# Patient Record
Sex: MALE | Race: WHITE | NOT HISPANIC OR LATINO | Employment: OTHER | RURAL
[De-identification: names, ages, dates, MRNs, and addresses within clinical notes are randomized per-mention and may not be internally consistent; named-entity substitution may affect disease eponyms.]

---

## 2022-04-13 ENCOUNTER — HOSPITAL ENCOUNTER (EMERGENCY)
Facility: HOSPITAL | Age: 52
Discharge: HOME | End: 2022-04-13
Attending: EMERGENCY MEDICINE
Payer: MEDICARE

## 2022-04-13 VITALS
WEIGHT: 250 LBS | HEIGHT: 74 IN | SYSTOLIC BLOOD PRESSURE: 151 MMHG | OXYGEN SATURATION: 97 % | TEMPERATURE: 98 F | RESPIRATION RATE: 18 BRPM | DIASTOLIC BLOOD PRESSURE: 92 MMHG | HEART RATE: 99 BPM | BODY MASS INDEX: 32.08 KG/M2

## 2022-04-13 DIAGNOSIS — E83.42 HYPOMAGNESEMIA: ICD-10-CM

## 2022-04-13 DIAGNOSIS — E11.65 UNCONTROLLED TYPE 2 DIABETES MELLITUS WITH HYPERGLYCEMIA: ICD-10-CM

## 2022-04-13 DIAGNOSIS — R06.02 SOB (SHORTNESS OF BREATH): ICD-10-CM

## 2022-04-13 DIAGNOSIS — I10 UNCONTROLLED HYPERTENSION: ICD-10-CM

## 2022-04-13 DIAGNOSIS — R59.0 MEDIASTINAL ADENOPATHY: Primary | ICD-10-CM

## 2022-04-13 LAB
ACETONE SERPL QL SCN: NEGATIVE
ALBUMIN SERPL BCP-MCNC: 3.5 G/DL (ref 3.5–5)
ALBUMIN/GLOB SERPL: 0.9 {RATIO}
ALP SERPL-CCNC: 103 U/L (ref 45–115)
ALT SERPL W P-5'-P-CCNC: 74 U/L (ref 16–61)
AMPHET UR QL SCN: NEGATIVE
AMYLASE SERPL-CCNC: 24 U/L (ref 25–115)
ANION GAP SERPL CALCULATED.3IONS-SCNC: 12 MMOL/L (ref 7–16)
AST SERPL W P-5'-P-CCNC: 41 U/L (ref 15–37)
BACTERIA #/AREA URNS HPF: ABNORMAL /HPF
BARBITURATES UR QL SCN: NEGATIVE
BASOPHILS # BLD AUTO: 0.04 K/UL (ref 0–0.2)
BASOPHILS NFR BLD AUTO: 0.6 % (ref 0–1)
BENZODIAZ METAB UR QL SCN: NEGATIVE
BILIRUB SERPL-MCNC: 0.7 MG/DL (ref 0–1.2)
BILIRUB UR QL STRIP: NEGATIVE
BUN SERPL-MCNC: 14 MG/DL (ref 7–18)
BUN/CREAT SERPL: 13 (ref 6–20)
CALCIUM SERPL-MCNC: 8.2 MG/DL (ref 8.5–10.1)
CANNABINOIDS UR QL SCN: NEGATIVE
CHLORIDE SERPL-SCNC: 101 MMOL/L (ref 98–107)
CLARITY UR: CLEAR
CO2 SERPL-SCNC: 26 MMOL/L (ref 21–32)
COARSE GRAN CASTS #/AREA URNS LPF: ABNORMAL /LPF
COCAINE UR QL SCN: NEGATIVE
COLOR UR: ABNORMAL
CREAT SERPL-MCNC: 1.1 MG/DL (ref 0.7–1.3)
D DIMER PPP FEU-MCNC: 1.36 ΜG/ML (ref 0–0.47)
DIFFERENTIAL METHOD BLD: ABNORMAL
EOSINOPHIL # BLD AUTO: 0.12 K/UL (ref 0–0.5)
EOSINOPHIL NFR BLD AUTO: 1.7 % (ref 1–4)
ERYTHROCYTE [DISTWIDTH] IN BLOOD BY AUTOMATED COUNT: 14.1 % (ref 11.5–14.5)
FINE GRAN CASTS #/AREA URNS LPF: ABNORMAL /LPF
GLOBULIN SER-MCNC: 3.7 G/DL (ref 2–4)
GLUCOSE SERPL-MCNC: 178 MG/DL (ref 70–105)
GLUCOSE SERPL-MCNC: 265 MG/DL (ref 70–105)
GLUCOSE SERPL-MCNC: 436 MG/DL (ref 74–106)
GLUCOSE UR STRIP-MCNC: >=1000 MG/DL
HCO3 UR-SCNC: 28.5 MMOL/L (ref 21–28)
HCT VFR BLD AUTO: 44.7 % (ref 40–54)
HGB BLD-MCNC: 15.1 G/DL (ref 13.5–18)
IMM GRANULOCYTES # BLD AUTO: 0.02 K/UL (ref 0–0.04)
IMM GRANULOCYTES NFR BLD: 0.3 % (ref 0–0.4)
KETONES UR STRIP-SCNC: NEGATIVE MG/DL
LEUKOCYTE ESTERASE UR QL STRIP: NEGATIVE
LYMPHOCYTES # BLD AUTO: 1.52 K/UL (ref 1–4.8)
LYMPHOCYTES NFR BLD AUTO: 21.1 % (ref 27–41)
MAGNESIUM SERPL-MCNC: 1.6 MG/DL (ref 1.7–2.3)
MCH RBC QN AUTO: 27.7 PG (ref 27–31)
MCHC RBC AUTO-ENTMCNC: 33.8 G/DL (ref 32–36)
MCV RBC AUTO: 82 FL (ref 80–96)
MIXED CELL CASTS #/AREA UR COMP ASSIST: ABNORMAL /LPF
MONOCYTES # BLD AUTO: 0.46 K/UL (ref 0–0.8)
MONOCYTES NFR BLD AUTO: 6.4 % (ref 2–6)
MPC BLD CALC-MCNC: 10.9 FL (ref 9.4–12.4)
NEUTROPHILS # BLD AUTO: 5.03 K/UL (ref 1.8–7.7)
NEUTROPHILS NFR BLD AUTO: 69.9 % (ref 53–65)
NITRITE UR QL STRIP: NEGATIVE
NT-PROBNP SERPL-MCNC: 4255 PG/ML (ref 1–125)
OPIATES UR QL SCN: NEGATIVE
PCO2 BLDA: 45 MMHG (ref 35–48)
PCP UR QL SCN: NEGATIVE
PH SMN: 7.41 [PH] (ref 7.35–7.45)
PH UR STRIP: 5.5 PH UNITS
PLATELET # BLD AUTO: 243 K/UL (ref 150–400)
PO2 BLDA: 75 MMHG (ref 83–108)
POC BASE EXCESS: 3.3 MMOL/L (ref -2–3)
POC SATURATED O2: 95 %
POTASSIUM SERPL-SCNC: 3.8 MMOL/L (ref 3.5–5.1)
PROT SERPL-MCNC: 7.2 G/DL (ref 6.4–8.2)
PROT UR QL STRIP: 100
RBC # BLD AUTO: 5.45 M/UL (ref 4.6–6.2)
RBC # UR STRIP: NEGATIVE /UL
RBC #/AREA URNS HPF: ABNORMAL /HPF
SODIUM SERPL-SCNC: 135 MMOL/L (ref 136–145)
SP GR UR STRIP: >=1.03
SQUAMOUS #/AREA URNS LPF: ABNORMAL /LPF
TROPONIN I SERPL HS-MCNC: 58.6 PG/ML
UROBILINOGEN UR STRIP-ACNC: 0.2 MG/DL
WBC # BLD AUTO: 7.19 K/UL (ref 4.5–11)
WBC #/AREA URNS HPF: ABNORMAL /HPF

## 2022-04-13 PROCEDURE — 80307 DRUG TEST PRSMV CHEM ANLYZR: CPT | Performed by: EMERGENCY MEDICINE

## 2022-04-13 PROCEDURE — 63600175 PHARM REV CODE 636 W HCPCS: Performed by: EMERGENCY MEDICINE

## 2022-04-13 PROCEDURE — 99900035 HC TECH TIME PER 15 MIN (STAT)

## 2022-04-13 PROCEDURE — 82962 GLUCOSE BLOOD TEST: CPT

## 2022-04-13 PROCEDURE — 99284 EMERGENCY DEPT VISIT MOD MDM: CPT | Performed by: EMERGENCY MEDICINE

## 2022-04-13 PROCEDURE — 82009 KETONE BODYS QUAL: CPT | Performed by: EMERGENCY MEDICINE

## 2022-04-13 PROCEDURE — 83735 ASSAY OF MAGNESIUM: CPT | Performed by: EMERGENCY MEDICINE

## 2022-04-13 PROCEDURE — 96376 TX/PRO/DX INJ SAME DRUG ADON: CPT | Mod: 59

## 2022-04-13 PROCEDURE — 96374 THER/PROPH/DIAG INJ IV PUSH: CPT

## 2022-04-13 PROCEDURE — 93005 ELECTROCARDIOGRAM TRACING: CPT

## 2022-04-13 PROCEDURE — 82150 ASSAY OF AMYLASE: CPT | Performed by: EMERGENCY MEDICINE

## 2022-04-13 PROCEDURE — 27000221 HC OXYGEN, UP TO 24 HOURS

## 2022-04-13 PROCEDURE — 25000003 PHARM REV CODE 250: Performed by: EMERGENCY MEDICINE

## 2022-04-13 PROCEDURE — 94760 N-INVAS EAR/PLS OXIMETRY 1: CPT | Mod: XB

## 2022-04-13 PROCEDURE — 83880 ASSAY OF NATRIURETIC PEPTIDE: CPT | Performed by: EMERGENCY MEDICINE

## 2022-04-13 PROCEDURE — 85025 COMPLETE CBC W/AUTO DIFF WBC: CPT | Performed by: EMERGENCY MEDICINE

## 2022-04-13 PROCEDURE — 82803 BLOOD GASES ANY COMBINATION: CPT | Mod: 59

## 2022-04-13 PROCEDURE — 82803 BLOOD GASES ANY COMBINATION: CPT

## 2022-04-13 PROCEDURE — 99285 EMERGENCY DEPT VISIT HI MDM: CPT | Mod: 25

## 2022-04-13 PROCEDURE — 93010 EKG 12-LEAD: ICD-10-PCS | Mod: ,,, | Performed by: EMERGENCY MEDICINE

## 2022-04-13 PROCEDURE — 80053 COMPREHEN METABOLIC PANEL: CPT | Performed by: EMERGENCY MEDICINE

## 2022-04-13 PROCEDURE — 96361 HYDRATE IV INFUSION ADD-ON: CPT

## 2022-04-13 PROCEDURE — 94640 AIRWAY INHALATION TREATMENT: CPT

## 2022-04-13 PROCEDURE — 36600 WITHDRAWAL OF ARTERIAL BLOOD: CPT

## 2022-04-13 PROCEDURE — 84484 ASSAY OF TROPONIN QUANT: CPT | Performed by: EMERGENCY MEDICINE

## 2022-04-13 PROCEDURE — 25000242 PHARM REV CODE 250 ALT 637 W/ HCPCS: Performed by: EMERGENCY MEDICINE

## 2022-04-13 PROCEDURE — 36415 COLL VENOUS BLD VENIPUNCTURE: CPT | Performed by: EMERGENCY MEDICINE

## 2022-04-13 PROCEDURE — 25500020 PHARM REV CODE 255: Performed by: EMERGENCY MEDICINE

## 2022-04-13 PROCEDURE — 85378 FIBRIN DEGRADE SEMIQUANT: CPT | Performed by: EMERGENCY MEDICINE

## 2022-04-13 PROCEDURE — 93010 ELECTROCARDIOGRAM REPORT: CPT | Mod: ,,, | Performed by: EMERGENCY MEDICINE

## 2022-04-13 PROCEDURE — 81001 URINALYSIS AUTO W/SCOPE: CPT | Mod: 59 | Performed by: EMERGENCY MEDICINE

## 2022-04-13 RX ORDER — METFORMIN HYDROCHLORIDE 500 MG/1
500 TABLET ORAL
Qty: 30 TABLET | Refills: 0 | Status: SHIPPED | OUTPATIENT
Start: 2022-04-13 | End: 2023-03-17

## 2022-04-13 RX ORDER — IPRATROPIUM BROMIDE AND ALBUTEROL SULFATE 2.5; .5 MG/3ML; MG/3ML
3 SOLUTION RESPIRATORY (INHALATION)
Status: COMPLETED | OUTPATIENT
Start: 2022-04-13 | End: 2022-04-13

## 2022-04-13 RX ORDER — LANOLIN ALCOHOL/MO/W.PET/CERES
400 CREAM (GRAM) TOPICAL DAILY
Qty: 30 TABLET | Refills: 0 | Status: SHIPPED | OUTPATIENT
Start: 2022-04-13 | End: 2023-03-17

## 2022-04-13 RX ADMIN — IPRATROPIUM BROMIDE AND ALBUTEROL SULFATE 3 ML: .5; 2.5 SOLUTION RESPIRATORY (INHALATION) at 10:04

## 2022-04-13 RX ADMIN — SODIUM CHLORIDE 1000 ML: 9 INJECTION, SOLUTION INTRAVENOUS at 10:04

## 2022-04-13 RX ADMIN — HUMAN INSULIN 10 UNITS: 100 INJECTION, SOLUTION SUBCUTANEOUS at 12:04

## 2022-04-13 RX ADMIN — IOPAMIDOL 80 ML: 755 INJECTION, SOLUTION INTRAVENOUS at 12:04

## 2022-04-13 RX ADMIN — HUMAN INSULIN 5 UNITS: 100 INJECTION, SOLUTION SUBCUTANEOUS at 01:04

## 2022-04-13 NOTE — ED PROVIDER NOTES
Encounter Date: 2022       History     Chief Complaint   Patient presents with    Shortness of Breath    Night Sweats     Patient presents with chief complaint of shortness of breath.  Patient states it started suddenly on  around the time of his son's birthday.  Denies chest pain.  Dyspnea is at rest and worse with exertion.  States he gets short of breath walking across the room.  He has not had any leg swelling or calf tenderness.  Patient states all of this began back in 2021. Reports that it started with a rash on his arms, dorsal surface.  Then in 2022 he began feeling generally weak with exertional weakness as well.  Reports in February he started having a feeling of feeling hungry all the time along with night sweats and increased abdominal gas.  Denies weight loss, states he feels like he has been gaining weight since February.  Patient previously worked as a  until he injured his back in  and has been on disability since then and has had low back surgery.  He has no focal deficits in his extremities.        Review of patient's allergies indicates:   Allergen Reactions    Morphine      Past Medical History:   Diagnosis Date    Mixed hyperlipidemia      Past Surgical History:   Procedure Laterality Date    LUMBAR FUSION      SHOULDER SURGERY       History reviewed. No pertinent family history.  Social History     Tobacco Use    Smoking status: Former Smoker     Types: Cigarettes     Quit date: 2022     Years since quittin.1    Smokeless tobacco: Never Used   Substance Use Topics    Drug use: Never     Review of Systems   Constitutional: Positive for activity change ( reports a sharp decrease in his activity level as he is fatigued all the time.  States he mostly lies around in the bed.), appetite change ( reports increased appetite), diaphoresis (Reports night sweats) and fatigue. Negative for chills, fever and unexpected weight  change.   HENT: Negative.    Eyes: Negative.    Respiratory: Positive for cough ( has had a chronic nonproductive cough for 1-2 months.) and shortness of breath. Negative for apnea, choking, chest tightness, wheezing and stridor.    Cardiovascular: Negative.  Negative for chest pain, palpitations and leg swelling.        Negative except for exertional dyspnea.   Gastrointestinal: Negative for abdominal distention, abdominal pain, anal bleeding, blood in stool, constipation, diarrhea, nausea, rectal pain and vomiting.        Reports increased gas and feeling of hunger all the time.   Endocrine: Negative for cold intolerance, heat intolerance, polydipsia, polyphagia and polyuria.   Genitourinary: Negative.    Musculoskeletal: Negative.    Skin: Positive for rash ( has had a scaling pink rash of the dorsum of both arms for 3-4 months.). Negative for color change, pallor and wound.   Neurological: Negative.    Hematological: Negative.    Psychiatric/Behavioral: Negative.    All other systems reviewed and are negative.      Physical Exam     Initial Vitals [04/13/22 0906]   BP Pulse Resp Temp SpO2   (!) 142/93 101 (!) 24 97.8 °F (36.6 °C) 95 %      MAP       --         Physical Exam    Nursing note and vitals reviewed.  Constitutional: He appears well-developed and well-nourished. He is not diaphoretic. He is active and cooperative.  Non-toxic appearance. He has a sickly appearance. He does not appear ill. No distress.   HENT:   Head: Normocephalic.   Right Ear: External ear normal.   Left Ear: External ear normal.   Nose: Nose normal.   Mouth/Throat: Oropharynx is clear and moist. No oropharyngeal exudate.   Eyes: Conjunctivae and EOM are normal. Pupils are equal, round, and reactive to light. Right eye exhibits no discharge. Left eye exhibits no discharge. No scleral icterus.   Neck: Neck supple. No tracheal deviation present. No JVD present.   Normal range of motion.  Cardiovascular: Normal rate, regular rhythm,  normal heart sounds and intact distal pulses.   No murmur heard.  Pulmonary/Chest: Breath sounds normal. No stridor. No respiratory distress. He has no wheezes. He has no rhonchi. He has no rales.   Abdominal: Abdomen is soft. Bowel sounds are normal. He exhibits no distension and no mass. There is no abdominal tenderness. There is no rebound and no guarding.   Musculoskeletal:         General: No tenderness or edema. Normal range of motion.      Cervical back: Normal range of motion and neck supple.     Lymphadenopathy:     He has no cervical adenopathy.   Neurological: He is alert and oriented to person, place, and time. He has normal strength. No cranial nerve deficit or sensory deficit. GCS score is 15. GCS eye subscore is 4. GCS verbal subscore is 5. GCS motor subscore is 6.   Skin: Skin is warm and dry. Capillary refill takes 2 to 3 seconds. No rash noted. No erythema. No pallor.   Psychiatric: He has a normal mood and affect. His behavior is normal.         Medical Screening Exam   See Full Note    ED Course   Procedures  Labs Reviewed   AMYLASE - Abnormal; Notable for the following components:       Result Value    Amylase 24 (*)     All other components within normal limits   NT-PRO NATRIURETIC PEPTIDE - Abnormal; Notable for the following components:    ProBNP 4,255 (*)     All other components within normal limits   COMPREHENSIVE METABOLIC PANEL - Abnormal; Notable for the following components:    Sodium 135 (*)     Glucose 436 (*)     Calcium 8.2 (*)     ALT 74 (*)     AST 41 (*)     All other components within normal limits   D DIMER, QUANTITATIVE - Abnormal; Notable for the following components:    D-Dimer 1.36 (*)     All other components within normal limits   MAGNESIUM - Abnormal; Notable for the following components:    Magnesium 1.6 (*)     All other components within normal limits   URINALYSIS, REFLEX TO URINE CULTURE - Abnormal; Notable for the following components:    Protein,   (*)      Glucose, UA >=1000 (*)     Specific Gravity, UA >=1.030 (*)     All other components within normal limits   CBC WITH DIFFERENTIAL - Abnormal; Notable for the following components:    Neutrophils % 69.9 (*)     Lymphocytes % 21.1 (*)     Monocytes % 6.4 (*)     All other components within normal limits   URINALYSIS, MICROSCOPIC - Abnormal; Notable for the following components:    Squamous Epithelial Cells, UA Occasional (*)     Fine Granular Casts, UA 10-25 (*)     Coarse Granular Casts, UA 0-2 (*)     Cellular Casts 2-5 (*)     All other components within normal limits   POCT GLUCOSE MONITORING CONTINUOUS - Abnormal; Notable for the following components:    POC Glucose 265 (*)     All other components within normal limits   POCT GLUCOSE MONITORING CONTINUOUS - Abnormal; Notable for the following components:    POC Glucose 178 (*)     All other components within normal limits   DRUG SCREEN, URINE (BEAKER) - Normal    Narrative:     The results of screening tests should be considered presumptive. Confirmatory testing is available upon request.    Cutoff Points:  PCP:         25ng/mL  AMPH:        500ng/mL  SAVANNAH:        200ng/mL  HENRIQUE:        200ng/mL  THC:         50ng/mL  CARMELINA:         300ng/mL  OPI:         2000ng/mL   TROPONIN I - Normal   CBC W/ AUTO DIFFERENTIAL    Narrative:     The following orders were created for panel order CBC auto differential.  Procedure                               Abnormality         Status                     ---------                               -----------         ------                     CBC with Differential[[id number]]        Abnormal            Final result                 Please view results for these tests on the individual orders.   ACETONE   POCT GLUCOSE MONITORING CONTINUOUS   POCT GLUCOSE MONITORING CONTINUOUS        ECG Results          EKG 12-lead (In process)  Result time 04/13/22 10:42:12    In process by Interface, Lab In [facility] (04/13/22 10:42:12)                  Narrative:    Test Reason : R06.02,    Vent. Rate : 106 BPM     Atrial Rate : 106 BPM     P-R Int : 164 ms          QRS Dur : 088 ms      QT Int : 362 ms       P-R-T Axes : 079 -70 049 degrees     QTc Int : 480 ms    Sinus tachycardia  Possible Left atrial enlargement  Left axis deviation  Nonspecific T wave abnormality  Abnormal ECG  No previous ECGs available    Referred By: [email or web address]   SELF           Confirmed By:                             Imaging Results          CTA Chest Non-Coronary (PE Study) (Final result)  Result time 04/13/22 12:19:45    Final result by Shaw Burleson DO (04/13/22 12:19:45)                 Impression:      1. No evidence to suggest pulmonary embolism.    2.  Multiple enlarged lymph nodes within the mediastinum and upper abdomen.  Correlate with B symptoms.      Electronically signed by: Sahw Burleson  Date:    04/13/2022  Time:    12:19             Narrative:    EXAMINATION:  CTA CHEST NON CORONARY    CLINICAL HISTORY:  Pulmonary embolism (PE) suspected, positive D-dimer;    TECHNIQUE:  Multiplanar CT of the chest with PE protocol.  MIP projections obtained.  This exam is adequate for interpretation.    COMPARISON:  Recent chest radiograph    FINDINGS:  Pulmonary artery: Patent    Lower neck: Multiple nonenlarged lymph nodes    Chest/airways: Lungs are clear    Mediastinum: There are multiple prominent and enlarged lymph nodes within the mediastinum.  Pre-vascular lymph node measures 1.4 cm.  Subcarinal lymph node at 1.7 cm.    Chest wall: Normal    Bones: Degenerative change    Upper abdomen: Multiple enlarged lymph nodes partially visualized in the hepatic gastric ligament and within the retroperitoneum.                               X-Ray Chest PA And Lateral (Final result)  Result time 04/13/22 09:42:15    Final result by Serenity Madrid MD (04/13/22 09:42:15)                 Impression:      No acute cardiopulmonary abnormality is seen.      Electronically signed by: Serenity  Mercy  Date:    04/13/2022  Time:    09:42             Narrative:    EXAMINATION:  XR CHEST PA AND LATERAL    CLINICAL HISTORY:  Shortness of breath    TECHNIQUE:  PA and lateral views of the chest were performed.    COMPARISON:  None    FINDINGS:  The heart and mediastinal contours are normal. The pulmonary vasculature is normal. There is no consolidation, pneumothorax, or pleural effusion. Degenerative changes are noted within the thoracic spine.                                 Medications   sodium chloride 0.9% bolus 1,000 mL (0 mLs Intravenous Stopped 4/13/22 1052)   albuterol-ipratropium 2.5 mg-0.5 mg/3 mL nebulizer solution 3 mL (3 mLs Nebulization Given 4/13/22 1013)   iopamidoL (ISOVUE-370) injection 100 mL (80 mLs Intravenous Given 4/13/22 1211)   insulin regular injection 10 Units (10 Units Intravenous Given 4/13/22 1225)   insulin regular injection 5 Units (5 Units Intravenous Given 4/13/22 1311)     Medical Decision Making:   Clinical Tests:   Radiological Study: Reviewed  ED Management:  Patient was given IV fluids and insulin glucose is now down to 178 mg/dL and patient feels better.  Radiologist report for CT PE study indicates no evidence of pulmonary embolism.  There is some hilar and abdominal adenopathy noted which will need further evaluation as an outpatient.                 Clinical Impression:   Final diagnoses:  [R06.02] SOB (shortness of breath)  [E11.65] Uncontrolled type 2 diabetes mellitus with hyperglycemia  [I10] Uncontrolled hypertension  [E83.42] Hypomagnesemia  [R59.0] Mediastinal adenopathy (Primary)          ED Disposition Condition    Discharge Stable        ED Prescriptions     Medication Sig Dispense Start Date End Date Auth. Provider    magnesium oxide (MAG-OX) 400 mg (241.3 mg magnesium) tablet Take 1 tablet (400 mg total) by mouth once daily. 30 tablet 4/13/2022 5/13/2022 Lion Owens DO    metFORMIN (GLUCOPHAGE) 500 MG tablet Take 1 tablet (500 mg total) by mouth daily  with breakfast. 30 tablet 4/13/2022 4/13/2023 Lion Owens,         Follow-up Information     Follow up With Specialties Details Why Contact Info    Wiley Tejeda MD Family Medicine Go in 1 day For further evaluation, and referral to pulmonology and Cardiology. [address]  [address]  [facility] [address]  [phone]             Lion Owens,   04/13/22 1353       Lion Owens DO  04/13/22 5883

## 2022-04-13 NOTE — ED TRIAGE NOTES
PT REPORTS COUGH AND SOB. PT DENIES CP. PT REPORTS NIGHT SWEATS FOR PAST MONTH. PT DENIES ANY HOME MEDICATIONS OR MEDICATION HISTORY.

## 2022-04-13 NOTE — ED NOTES
Dr. Owens notified of elevated BP.   Keystone Flap Text: The defect edges were debeveled with a #15 scalpel blade.  Given the location of the defect, shape of the defect a keystone flap was deemed most appropriate.  Using a sterile surgical marker, an appropriate keystone flap was drawn incorporating the defect, outlining the appropriate donor tissue and placing the expected incisions within the relaxed skin tension lines where possible. The area thus outlined was incised deep to adipose tissue with a #15 scalpel blade.  The skin margins were undermined to an appropriate distance in all directions around the primary defect and laterally outward around the flap utilizing iris scissors.

## 2022-04-14 ENCOUNTER — TELEPHONE (OUTPATIENT)
Dept: EMERGENCY MEDICINE | Facility: HOSPITAL | Age: 52
End: 2022-04-14
Payer: MEDICARE

## 2023-03-17 ENCOUNTER — HOSPITAL ENCOUNTER (EMERGENCY)
Facility: HOSPITAL | Age: 53
Discharge: HOME | End: 2023-03-17
Attending: SPECIALIST
Payer: MEDICARE

## 2023-03-17 VITALS
TEMPERATURE: 98 F | OXYGEN SATURATION: 98 % | DIASTOLIC BLOOD PRESSURE: 76 MMHG | HEART RATE: 50 BPM | RESPIRATION RATE: 18 BRPM | HEIGHT: 74 IN | BODY MASS INDEX: 28.88 KG/M2 | WEIGHT: 225 LBS | SYSTOLIC BLOOD PRESSURE: 110 MMHG

## 2023-03-17 DIAGNOSIS — F41.9 ANXIETY: ICD-10-CM

## 2023-03-17 DIAGNOSIS — H60.92 OTITIS EXTERNA OF LEFT EAR, UNSPECIFIED CHRONICITY, UNSPECIFIED TYPE: ICD-10-CM

## 2023-03-17 DIAGNOSIS — B86 SCABIES: Primary | ICD-10-CM

## 2023-03-17 PROCEDURE — 99284 EMERGENCY DEPT VISIT MOD MDM: CPT

## 2023-03-17 PROCEDURE — 99283 PR EMERGENCY DEPT VISIT,LEVEL III: ICD-10-PCS | Mod: EDII,,, | Performed by: SPECIALIST

## 2023-03-17 PROCEDURE — 99283 EMERGENCY DEPT VISIT LOW MDM: CPT | Mod: EDII,,, | Performed by: SPECIALIST

## 2023-03-17 RX ORDER — CIPROFLOXACIN AND DEXAMETHASONE 3; 1 MG/ML; MG/ML
4 SUSPENSION/ DROPS AURICULAR (OTIC) 2 TIMES DAILY
Qty: 7.5 ML | Status: SHIPPED | OUTPATIENT
Start: 2023-03-17

## 2023-03-17 RX ORDER — IVERMECTIN 3 MG/1
3 TABLET ORAL ONCE
Qty: 1 TABLET | Refills: 0 | Status: SHIPPED | OUTPATIENT
Start: 2023-03-17 | End: 2023-03-17

## 2023-03-17 RX ORDER — CARVEDILOL 3.12 MG/1
1 TABLET ORAL 2 TIMES DAILY
COMMUNITY
Start: 2023-02-27

## 2023-03-17 RX ORDER — ATORVASTATIN CALCIUM 40 MG/1
40 TABLET, FILM COATED ORAL
COMMUNITY
Start: 2023-02-16

## 2023-03-17 RX ORDER — PEN NEEDLE, DIABETIC 32GX 5/32"
NEEDLE, DISPOSABLE MISCELLANEOUS
COMMUNITY
Start: 2023-01-30

## 2023-03-17 RX ORDER — HYDROXYZINE PAMOATE 25 MG/1
25 CAPSULE ORAL EVERY 6 HOURS PRN
Qty: 30 CAPSULE | Refills: 0 | Status: SHIPPED | OUTPATIENT
Start: 2023-03-17

## 2023-03-17 RX ORDER — INSULIN ASPART 100 [IU]/ML
6 INJECTION, SOLUTION INTRAVENOUS; SUBCUTANEOUS 3 TIMES DAILY
COMMUNITY
Start: 2023-02-27

## 2023-03-17 RX ORDER — PEN NEEDLE, DIABETIC 29 G X1/2"
NEEDLE, DISPOSABLE MISCELLANEOUS
COMMUNITY
Start: 2023-01-30

## 2023-03-17 RX ORDER — PERMETHRIN 50 MG/G
CREAM TOPICAL
Qty: 60 G | Refills: 1 | Status: SHIPPED | OUTPATIENT
Start: 2023-03-17

## 2023-03-17 RX ORDER — INSULIN GLARGINE 100 [IU]/ML
15 INJECTION, SOLUTION SUBCUTANEOUS
COMMUNITY
Start: 2023-02-27

## 2023-03-17 RX ORDER — TRIAMCINOLONE ACETONIDE 1 MG/G
CREAM TOPICAL
COMMUNITY
Start: 2022-11-30

## 2023-03-17 RX ORDER — SPIRONOLACTONE 25 MG/1
25 TABLET ORAL
COMMUNITY
Start: 2023-02-27

## 2023-03-17 RX ORDER — PANTOPRAZOLE SODIUM 40 MG/1
1 TABLET, DELAYED RELEASE ORAL EVERY MORNING
COMMUNITY
Start: 2023-02-27

## 2023-03-17 RX ORDER — FUROSEMIDE 20 MG/1
1 TABLET ORAL 2 TIMES DAILY
COMMUNITY
Start: 2023-02-27

## 2023-03-17 RX ORDER — FERROUS SULFATE 325(65) MG
325 TABLET ORAL
COMMUNITY

## 2023-03-17 RX ORDER — GLIPIZIDE 5 MG/1
2 TABLET ORAL DAILY
COMMUNITY
Start: 2022-12-29

## 2023-03-17 RX ORDER — LOSARTAN POTASSIUM 50 MG/1
1 TABLET ORAL DAILY
COMMUNITY
Start: 2023-01-30

## 2023-03-17 RX ORDER — ACETAMINOPHEN 500 MG
1 TABLET ORAL DAILY
COMMUNITY

## 2023-03-17 NOTE — ED TRIAGE NOTES
"Pt complaining of pain left ear/jaw. States that he has scabies x1 year and now "they have moved into my ear. Noted scabbed spots to generalized body areas, arms, legs, trunk.  "

## 2023-03-17 NOTE — ED PROVIDER NOTES
Encounter Date: 3/17/2023       History     Chief Complaint   Patient presents with    Scabies     Patient is a 53 yo wm with multiple health issues and on disability.  He was helping a neighbor and contracted scabies x almost a year.  He has been told that he has psoriasis by his pcp.  Patient has multiple scabie like tracts in his arms and various places in his body.  He feels certain that is his condition and he appears very angry at his pcp for not seeing him.  I explained that she is not open on Friday.  He has with him a device which reveals activity under the skin and has been noticing tracks.     Review of patient's allergies indicates:   Allergen Reactions    Morphine      Past Medical History:   Diagnosis Date    CHF (congestive heart failure)     GERD (gastroesophageal reflux disease)     Hypertension     Mixed hyperlipidemia      Past Surgical History:   Procedure Laterality Date    LUMBAR FUSION      SHOULDER SURGERY       History reviewed. No pertinent family history.  Social History     Tobacco Use    Smoking status: Every Day     Types: Cigarettes     Last attempt to quit: 2022     Years since quittin.1    Smokeless tobacco: Never   Substance Use Topics    Alcohol use: Never    Drug use: Never     Review of Systems   Skin:  Positive for rash.        Severe itching which is constant.     Physical Exam     Initial Vitals [23 1721]   BP Pulse Resp Temp SpO2   129/85 (!) 50 18 97.8 °F (36.6 °C) 98 %      MAP       --         Physical Exam    Nursing note and vitals reviewed.  Constitutional: He appears well-developed and well-nourished. He appears distressed.   HENT:   Head: Normocephalic and atraumatic.   Eyes: Pupils are equal, round, and reactive to light.   Neck: Neck supple.   Normal range of motion.  Cardiovascular:  Regular rhythm.           Pulmonary/Chest: Breath sounds normal.   Musculoskeletal:         General: Normal range of motion.      Cervical back: Normal range of motion and  neck supple.     Neurological: He is alert and oriented to person, place, and time. He has normal strength. GCS score is 15. GCS eye subscore is 4. GCS verbal subscore is 5. GCS motor subscore is 6.   Skin: Skin is warm.   Multiple areas on skin that do appear like scabies   Psychiatric: Thought content normal.   angry       Medical Screening Exam   See Full Note    ED Course   Procedures  Labs Reviewed - No data to display       Imaging Results    None          Medications - No data to display                    Clinical Impression:   Final diagnoses:  [B86] Scabies (Primary)  [H60.92] Otitis externa of left ear, unspecified chronicity, unspecified type  [F41.9] Anxiety        ED Disposition Condition    Discharge Stable          ED Prescriptions       Medication Sig Dispense Start Date End Date Auth. Provider    permethrin (ELIMITE) 5 % cream Apply to affected area once - follow directions on label 60 g 3/17/2023 -- Monica Pleitez MD    ivermectin (STROMECTOL) 3 mg Tab (Expires today) Take 1 tablet (3 mg total) by mouth once. for 1 dose 1 tablet 3/17/2023 3/17/2023 Monica Pleitez MD    ciprofloxacin-dexAMETHasone 0.3-0.1% (CIPRODEX) 0.3-0.1 % DrpS Place 4 drops into both ears 2 (two) times daily. 7.5 mL 3/17/2023 -- Monica Pleitez MD    hydrOXYzine pamoate (VISTARIL) 25 MG Cap Take 1 capsule (25 mg total) by mouth every 6 (six) hours as needed (itching). 30 capsule 3/17/2023 -- Monica Pleitez MD          Follow-up Information    None          Monica Pleitez MD  03/17/23 2017

## 2023-04-15 ENCOUNTER — HOSPITAL ENCOUNTER (EMERGENCY)
Facility: HOSPITAL | Age: 53
End: 2023-04-15
Attending: PEDIATRICS
Payer: MEDICARE

## 2023-04-15 VITALS
RESPIRATION RATE: 20 BRPM | HEIGHT: 74 IN | WEIGHT: 231.13 LBS | DIASTOLIC BLOOD PRESSURE: 96 MMHG | HEART RATE: 67 BPM | SYSTOLIC BLOOD PRESSURE: 170 MMHG | OXYGEN SATURATION: 98 % | BODY MASS INDEX: 29.66 KG/M2 | TEMPERATURE: 99 F

## 2023-04-15 DIAGNOSIS — R21 RASH AND NONSPECIFIC SKIN ERUPTION: Primary | ICD-10-CM

## 2023-04-15 LAB — GLUCOSE SERPL-MCNC: 412 MG/DL (ref 70–105)

## 2023-04-15 PROCEDURE — 99283 EMERGENCY DEPT VISIT LOW MDM: CPT | Mod: EDII,,, | Performed by: PEDIATRICS

## 2023-04-15 PROCEDURE — 99283 PR EMERGENCY DEPT VISIT,LEVEL III: ICD-10-PCS | Mod: EDII,,, | Performed by: PEDIATRICS

## 2023-04-15 PROCEDURE — 99282 EMERGENCY DEPT VISIT SF MDM: CPT

## 2023-04-15 PROCEDURE — 82962 GLUCOSE BLOOD TEST: CPT

## 2023-04-15 NOTE — ED PROVIDER NOTES
Encounter Date: 4/15/2023       History     Chief Complaint   Patient presents with    Scabies     C/o scabies infection since 2021     Patient comes to the emergency room with a long drawn out history.  Reports he was diagnosed with scabies over a year ago.  He was placed on the creams.  He reports they did not improve.  He followed up with his primary care who told him it was psoriasis he tried psoriasis cream with no improvement.  He has been treated for scabies multiple times once with ivermectin.  Currently he is put in permethrin cream that he mixed in lotion on his body multiple times a day.  He reports he continues to have a the scabies and that the medicines are not working.  He denies any fevers with this he does have itching and scratches at the lesions throughout.  He reports some of the lesions have been there for months and not healing.  Reports he has a history of congestive heart failure.  Reports that he also has diabetes.  He reports that he takes his other medicines as appropriate.  He has a digital microscope that he puts on his hands arms feet to try to show me what the holes from the scabies look like when they bite into him to go into the skin and they are burrowing tunnels.    Patient purchased a large thing of permethrin mixing with lotion and that is what he is applying to a skin    Review of patient's allergies indicates:   Allergen Reactions    Morphine      Past Medical History:   Diagnosis Date    CHF (congestive heart failure)     GERD (gastroesophageal reflux disease)     Hypertension     Mixed hyperlipidemia      Past Surgical History:   Procedure Laterality Date    LUMBAR FUSION      SHOULDER SURGERY       No family history on file.  Social History     Tobacco Use    Smoking status: Every Day     Types: Cigarettes     Last attempt to quit: 2022     Years since quittin.2    Smokeless tobacco: Never   Substance Use Topics    Alcohol use: Never    Drug use: Never      Review of Systems   Skin:  Positive for rash.   Psychiatric/Behavioral:  Positive for sleep disturbance.    All other systems reviewed and are negative.    Physical Exam     Initial Vitals [04/15/23 1441]   BP Pulse Resp Temp SpO2   (!) 179/108 (!) 130 20 98.6 °F (37 °C) 98 %      MAP       --         Physical Exam    Nursing note and vitals reviewed.  Constitutional: He appears well-developed and well-nourished.  Non-toxic appearance. He does not appear ill. No distress.   Patient smells of petroleum   HENT:   Right Ear: External ear normal.   Left Ear: External ear normal.   Pulmonary/Chest: No respiratory distress.     Neurological: He is alert and oriented to person, place, and time.   Skin: Skin is warm and dry. Capillary refill takes less than 2 seconds.   Multiple pinpoint to 6 8 mm lesions on the arms legs.  Excoriations on the arms and legs.  Some all in different stages of healing.  Unclear if this is scabies based on presentation.  Some look follicular or like folliculitis.   Psychiatric: He has a normal mood and affect. His behavior is normal. Judgment and thought content normal.       Medical Screening Exam   See Full Note    ED Course   Procedures  Labs Reviewed   POCT GLUCOSE MONITORING CONTINUOUS - Abnormal; Notable for the following components:       Result Value    POC Glucose 412 (*)     All other components within normal limits          Imaging Results    None          Medications - No data to display  Medical Decision Making:   Initial Assessment:   Skin lesions not exactly consistent with scabies.  Somewhere papular somewhat crusted.  Differential Diagnosis:   Dermatitis, skin eruption secondary to chemicals placed on the skin,  ED Management:  Discuss that it appears the medicines have not been working.  He needs to stop using the permethrin over-the-counter that is placed in lotion.  I did not prescribe another doses of ivermectin as this did not help.  He will be referred to Dermatology  for evaluation of all these lesions  I discussed the once again he needs to stop the over-the-counter medicines and mixing the permethrin with the lotion and placing on his body.                 Clinical Impression:   Final diagnoses:  [R21] Rash and nonspecific skin eruption (Primary)        ED Disposition Condition    Discharge Stable          ED Prescriptions    None       Follow-up Information    None          Carlos Mac MD  04/15/23 1515       Carlos Mac MD  04/15/23 1510

## 2023-04-15 NOTE — DISCHARGE INSTRUCTIONS
Stop placing the permethrin mixed with lotion on your skin.  Do not purchase other medications over-the-counter off the Internet to placed on your skin to treat this scabies  You have been referred to dermatology.  They should be contacting you

## 2023-04-15 NOTE — ED TRIAGE NOTES
"C/o "scabies on skin since oct 2021 and nobody will treat me"-he has bottle of premethrin 10% insecticide concentrate "jose been putting this with this (generic fast obsorbing lotion)to dilute it down to 5% like the cream that is prescription because it is too expensive to get-explained to patient that it is not advised to use this on skin-he points to arms and legs -"dont you see what  they have done me"noted scar tissue appearing areas-strong diesel like odor noted  "

## 2024-03-27 ENCOUNTER — HOSPITAL ENCOUNTER (OUTPATIENT)
Dept: RADIOLOGY | Facility: HOSPITAL | Age: 54
Discharge: HOME | End: 2024-03-27
Attending: NURSE PRACTITIONER
Payer: MEDICARE

## 2024-03-27 DIAGNOSIS — R10.9 ABDOMINAL PAIN, UNSPECIFIED ABDOMINAL LOCATION: ICD-10-CM

## 2024-03-27 DIAGNOSIS — R06.00 DYSPNEA, UNSPECIFIED TYPE: ICD-10-CM

## 2024-03-27 PROCEDURE — 71046 X-RAY EXAM CHEST 2 VIEWS: CPT | Mod: TC

## 2024-03-27 PROCEDURE — 74019 RADEX ABDOMEN 2 VIEWS: CPT | Mod: TC

## 2024-03-27 PROCEDURE — 76700 US EXAM ABDOM COMPLETE: CPT | Mod: TC

## 2024-07-16 ENCOUNTER — OFFICE VISIT (OUTPATIENT)
Dept: DERMATOLOGY | Facility: CLINIC | Age: 54
End: 2024-07-16
Payer: MEDICARE

## 2024-07-16 DIAGNOSIS — R21 RASH AND NONSPECIFIC SKIN ERUPTION: Primary | ICD-10-CM

## 2024-07-16 DIAGNOSIS — L01.00 IMPETIGO: ICD-10-CM

## 2024-07-16 DIAGNOSIS — F22 DELUSIONS OF PARASITOSIS: ICD-10-CM

## 2024-07-16 PROCEDURE — 87186 SC STD MICRODIL/AGAR DIL: CPT | Mod: ,,, | Performed by: CLINICAL MEDICAL LABORATORY

## 2024-07-16 PROCEDURE — 1159F MED LIST DOCD IN RCRD: CPT | Mod: CPTII,,, | Performed by: STUDENT IN AN ORGANIZED HEALTH CARE EDUCATION/TRAINING PROGRAM

## 2024-07-16 PROCEDURE — 87070 CULTURE OTHR SPECIMN AEROBIC: CPT | Mod: ,,, | Performed by: CLINICAL MEDICAL LABORATORY

## 2024-07-16 PROCEDURE — 99205 OFFICE O/P NEW HI 60 MIN: CPT | Mod: ,,, | Performed by: STUDENT IN AN ORGANIZED HEALTH CARE EDUCATION/TRAINING PROGRAM

## 2024-07-16 PROCEDURE — 4010F ACE/ARB THERAPY RXD/TAKEN: CPT | Mod: CPTII,,, | Performed by: STUDENT IN AN ORGANIZED HEALTH CARE EDUCATION/TRAINING PROGRAM

## 2024-07-16 PROCEDURE — 87077 CULTURE AEROBIC IDENTIFY: CPT | Mod: ,,, | Performed by: CLINICAL MEDICAL LABORATORY

## 2024-07-16 NOTE — PROGRESS NOTES
[facility]  Christopher Enriquez MD    [address] [place] [address]  [phone]    Fax: [phone]    Patient Name: Matteo Zhao  Medical Record Number: [id number]  PCP: Lisette, Primary Doctor  Age: 53 y.o. : 1970  Contact: [phone] (home)     CC: rash  History of Present Illness:     Matteo Zhao is a 53 y.o.  male with no history of skin cancer who presents to clinic today for concern for bug infestation in his skin. Previous treatments includes TAC 0.1% cream, permethrin cream and ivermectin. Patient reports crawling sensations in the skin. He reports parasites come from his saliva, feces, and skin. He reveals photos of all of these today with no identifiable parasite.     The patient has no other concerns today.    Review of Systems:     Unremarkable other than mentioned above.     Physical Exam:     General: Relaxed, oriented, alert    Skin examination of the scalp, face, neck, chest, back, abdomen, upper extremities and lower extremities were normal except for as listed below    - scabies scrapping negative   Assessment and Plan:     1.  Impetigo   - honey colored crusted plaques on the face, bilateral arms, legs    - discussed infectious nature   - Culture obtained     2. Delusions of Parasitosis  - Pruritus with belief that it is caused by unobservable parasitosis  - also known as a monosymptomatic hypochondriacal psychosis    Plan:   - scabies scraping x 2 performed   - wound culture   - CBC to evaluate for peripheral eosinophilia   - drug screen ordered     Counseling  Skin care: I explained to the patient that there was no clinical evidence at this time of an infestation. I discussed that biopsy and other tests may be necessary to rule out infestations and other causes of their symptoms.  Expectations: Delusions of parasitosis manifests as a fixed belief that skin symptoms are caused by insects, parasites or other infestations. It is a  diagnosis of exclusion and infestations, systemic causes and metabolic causes of pruritus must be evaluated. We must be able to reasonably exclude a sweta skin condition (e.g., scabies incognito) or other possible organic conditions, such as substance abuse (e.g., cocaine), neurologic (e.g., multiple sclerosis), endocrine (e.g., diabetes mellitus), hematologic/oncologic (e.g., lymphoma), nutritional (e.g., B12 or folate deficiency), infectious (e.g., Human Immunodeficiency Virus), cardiovascular (e.g., congestive heart failure), or renal (e.g., uremic pruritus) disorders.    Treatment options include therapy, pimozide, olanzapine, risperidone.     30 minutes spent with patient, 10 minutes on scabies scraping x2, 20 minutes reviewing chart and documenting       Christopher Enriquez MD   Mohs Surgery/Dermatologic Oncology  Dermatology

## 2024-07-18 ENCOUNTER — TELEPHONE (OUTPATIENT)
Dept: DERMATOLOGY | Facility: CLINIC | Age: 54
End: 2024-07-18
Payer: MEDICARE

## 2024-07-18 LAB — MICROORGANISM SPEC CULT: ABNORMAL

## 2024-07-18 RX ORDER — DOXYCYCLINE 100 MG/1
100 CAPSULE ORAL 2 TIMES DAILY
Qty: 14 CAPSULE | Refills: 0 | Status: SHIPPED | OUTPATIENT
Start: 2024-07-18

## 2024-10-17 ENCOUNTER — HOSPITAL ENCOUNTER (EMERGENCY)
Facility: HOSPITAL | Age: 54
Discharge: HOME | End: 2024-10-17
Attending: INTERNAL MEDICINE
Payer: MEDICARE

## 2024-10-17 VITALS
HEART RATE: 81 BPM | BODY MASS INDEX: 29.29 KG/M2 | RESPIRATION RATE: 20 BRPM | HEIGHT: 74 IN | WEIGHT: 228.19 LBS | OXYGEN SATURATION: 99 % | DIASTOLIC BLOOD PRESSURE: 70 MMHG | SYSTOLIC BLOOD PRESSURE: 101 MMHG | TEMPERATURE: 98 F

## 2024-10-17 DIAGNOSIS — E11.65 TYPE 2 DIABETES MELLITUS WITH HYPERGLYCEMIA, WITHOUT LONG-TERM CURRENT USE OF INSULIN: Primary | ICD-10-CM

## 2024-10-17 DIAGNOSIS — I10 HYPERTENSION: ICD-10-CM

## 2024-10-17 LAB
ALBUMIN SERPL BCP-MCNC: 3.6 G/DL (ref 3.5–5)
ALBUMIN/GLOB SERPL: 0.8 {RATIO}
ALP SERPL-CCNC: 159 U/L (ref 45–115)
ALT SERPL W P-5'-P-CCNC: 42 U/L (ref 16–61)
AMPHET UR QL SCN: NEGATIVE
ANION GAP SERPL CALCULATED.3IONS-SCNC: 14 MMOL/L (ref 7–16)
AST SERPL W P-5'-P-CCNC: 21 U/L (ref 15–37)
BARBITURATES UR QL SCN: NEGATIVE
BASOPHILS # BLD AUTO: 0.09 K/UL (ref 0–0.2)
BASOPHILS NFR BLD AUTO: 1 % (ref 0–1)
BENZODIAZ METAB UR QL SCN: NEGATIVE
BILIRUB SERPL-MCNC: 0.8 MG/DL (ref ?–1.2)
BILIRUB UR QL STRIP: NEGATIVE
BUN SERPL-MCNC: 34 MG/DL (ref 7–18)
BUN/CREAT SERPL: 32 (ref 6–20)
CALCIUM SERPL-MCNC: 9.5 MG/DL (ref 8.5–10.1)
CANNABINOIDS UR QL SCN: NEGATIVE
CHLORIDE SERPL-SCNC: 95 MMOL/L (ref 98–107)
CLARITY UR: CLEAR
CO2 SERPL-SCNC: 26 MMOL/L (ref 21–32)
COCAINE UR QL SCN: NEGATIVE
COLOR UR: YELLOW
CREAT SERPL-MCNC: 1.07 MG/DL (ref 0.7–1.3)
DIFFERENTIAL METHOD BLD: ABNORMAL
EGFR (NO RACE VARIABLE) (RUSH/TITUS): 84 ML/MIN/1.73M2
EOSINOPHIL # BLD AUTO: 0.33 K/UL (ref 0–0.5)
EOSINOPHIL NFR BLD AUTO: 3.6 % (ref 1–4)
ERYTHROCYTE [DISTWIDTH] IN BLOOD BY AUTOMATED COUNT: 12.7 % (ref 11.5–14.5)
GLOBULIN SER-MCNC: 4.6 G/DL (ref 2–4)
GLUCOSE SERPL-MCNC: 379 MG/DL (ref 70–105)
GLUCOSE SERPL-MCNC: 408 MG/DL (ref 74–106)
GLUCOSE UR STRIP-MCNC: >=1000 MG/DL
HCT VFR BLD AUTO: 44.7 % (ref 40–54)
HGB BLD-MCNC: 16.3 G/DL (ref 13.5–18)
IMM GRANULOCYTES # BLD AUTO: 0.03 K/UL (ref 0–0.04)
IMM GRANULOCYTES NFR BLD: 0.3 % (ref 0–0.4)
KETONES UR STRIP-SCNC: NEGATIVE MG/DL
LEUKOCYTE ESTERASE UR QL STRIP: NEGATIVE
LYMPHOCYTES # BLD AUTO: 1.84 K/UL (ref 1–4.8)
LYMPHOCYTES NFR BLD AUTO: 20.3 % (ref 27–41)
MCH RBC QN AUTO: 29.1 PG (ref 27–31)
MCHC RBC AUTO-ENTMCNC: 36.5 G/DL (ref 32–36)
MCV RBC AUTO: 79.8 FL (ref 80–96)
MONOCYTES # BLD AUTO: 0.64 K/UL (ref 0–0.8)
MONOCYTES NFR BLD AUTO: 7.1 % (ref 2–6)
MPC BLD CALC-MCNC: 10.3 FL (ref 9.4–12.4)
NEUTROPHILS # BLD AUTO: 6.12 K/UL (ref 1.8–7.7)
NEUTROPHILS NFR BLD AUTO: 67.7 % (ref 53–65)
NITRITE UR QL STRIP: NEGATIVE
NRBC # BLD AUTO: 0 X10E3/UL
NRBC, AUTO (.00): 0 %
OPIATES UR QL SCN: NEGATIVE
PCP UR QL SCN: NEGATIVE
PH UR STRIP: 5.5 PH UNITS
PLATELET # BLD AUTO: 272 K/UL (ref 150–400)
POTASSIUM SERPL-SCNC: 4.1 MMOL/L (ref 3.5–5.1)
PROT SERPL-MCNC: 8.2 G/DL (ref 6.4–8.2)
PROT UR QL STRIP: NEGATIVE
RBC # BLD AUTO: 5.6 M/UL (ref 4.6–6.2)
RBC # UR STRIP: NEGATIVE /UL
SODIUM SERPL-SCNC: 131 MMOL/L (ref 136–145)
SP GR UR STRIP: 1.01
TROPONIN I SERPL DL<=0.01 NG/ML-MCNC: 14.9 PG/ML
UROBILINOGEN UR STRIP-ACNC: 0.2 MG/DL
WBC # BLD AUTO: 9.05 K/UL (ref 4.5–11)

## 2024-10-17 PROCEDURE — 81003 URINALYSIS AUTO W/O SCOPE: CPT | Performed by: INTERNAL MEDICINE

## 2024-10-17 PROCEDURE — 80307 DRUG TEST PRSMV CHEM ANLYZR: CPT | Performed by: INTERNAL MEDICINE

## 2024-10-17 PROCEDURE — 85025 COMPLETE CBC W/AUTO DIFF WBC: CPT | Performed by: INTERNAL MEDICINE

## 2024-10-17 PROCEDURE — 99284 EMERGENCY DEPT VISIT MOD MDM: CPT | Performed by: INTERNAL MEDICINE

## 2024-10-17 PROCEDURE — 80053 COMPREHEN METABOLIC PANEL: CPT | Performed by: INTERNAL MEDICINE

## 2024-10-17 PROCEDURE — 93005 ELECTROCARDIOGRAM TRACING: CPT

## 2024-10-17 PROCEDURE — 82962 GLUCOSE BLOOD TEST: CPT

## 2024-10-17 PROCEDURE — 99284 EMERGENCY DEPT VISIT MOD MDM: CPT | Mod: 25

## 2024-10-17 PROCEDURE — 93010 ELECTROCARDIOGRAM REPORT: CPT | Mod: ,,, | Performed by: INTERNAL MEDICINE

## 2024-10-17 PROCEDURE — 84484 ASSAY OF TROPONIN QUANT: CPT | Performed by: INTERNAL MEDICINE

## 2024-10-17 RX ORDER — DAPAGLIFLOZIN 10 MG/1
10 TABLET, FILM COATED ORAL
COMMUNITY
Start: 2024-10-17

## 2024-10-17 RX ORDER — POTASSIUM CHLORIDE 20 MEQ/1
20 TABLET, EXTENDED RELEASE ORAL
COMMUNITY
Start: 2024-09-20

## 2024-10-17 RX ORDER — INSULIN GLARGINE 300 U/ML
35 INJECTION, SOLUTION SUBCUTANEOUS
COMMUNITY
Start: 2024-09-30

## 2024-10-17 RX ORDER — NAPROXEN SODIUM 220 MG/1
81 TABLET, FILM COATED ORAL DAILY
COMMUNITY

## 2024-10-17 NOTE — ED PROVIDER NOTES
Encounter Date: 10/17/2024       History     Chief Complaint   Patient presents with    Hyperglycemia     C/o high blood sugar     Patient comes in because he had elevated blood sugar this morning.  He said initially was over 500.  He went to see his primary care provider, nurse practitioner eren, and it was over 400.  The patient denies any polyuria polydipsia polyphagia, chest pain, shortness breath, PND and orthopnea.  Has history congestive heart failure, hypertension noncompliance.    Patient was states he was admitted to [facility] approximately 4 months ago with congestive heart failure, he did not follow up with his cardiologist for tests after that hospitalization.        Review of patient's allergies indicates:   Allergen Reactions    Morphine      Past Medical History:   Diagnosis Date    CHF (congestive heart failure)     GERD (gastroesophageal reflux disease)     Hypertension     Mixed hyperlipidemia      Past Surgical History:   Procedure Laterality Date    LUMBAR FUSION      SHOULDER SURGERY       No family history on file.  Social History     Tobacco Use    Smoking status: Every Day     Current packs/day: 0.00     Types: Cigarettes     Last attempt to quit: 2022     Years since quittin.7    Smokeless tobacco: Never   Substance Use Topics    Alcohol use: Never    Drug use: Never     Review of Systems   Constitutional:  Negative for fever.   HENT:  Negative for sore throat.    Respiratory:  Negative for shortness of breath.    Cardiovascular:  Negative for chest pain.   Gastrointestinal:  Negative for nausea.   Genitourinary:  Negative for dysuria.   Musculoskeletal:  Negative for back pain.   Skin:  Negative for rash.   Neurological:  Negative for weakness.   Hematological:  Does not bruise/bleed easily.       Physical Exam     Initial Vitals   BP Pulse Resp Temp SpO2   10/17/24 1124 10/17/24 1111 10/17/24 1111 -- 10/17/24 1111   101/70 81 20  97 %      MAP       --                 Physical Exam    Nursing note and vitals reviewed.  Constitutional: He appears well-developed.   HENT:   Head: Normocephalic.   Eyes: Pupils are equal, round, and reactive to light.   Neck: Trachea normal. Neck supple.   Normal range of motion.   Full passive range of motion without pain.     Cardiovascular:  Normal rate, regular rhythm, normal heart sounds and normal pulses.           Pulmonary/Chest: Effort normal and breath sounds normal. No respiratory distress.   Abdominal: Abdomen is soft and protuberant. Bowel sounds are normal. There is no abdominal tenderness.   Musculoskeletal:         General: Normal range of motion.      Cervical back: Full passive range of motion without pain, normal range of motion and neck supple.     Neurological: He is alert. He has normal strength and normal reflexes. No cranial nerve deficit. GCS eye subscore is 4. GCS verbal subscore is 5. GCS motor subscore is 6.   Skin: Skin is warm.         Medical Screening Exam   See Full Note    ED Course   Procedures  Labs Reviewed   COMPREHENSIVE METABOLIC PANEL - Abnormal       Result Value    Sodium 131 (*)     Potassium 4.1      Chloride 95 (*)     CO2 26      Anion Gap 14      Glucose 408 (*)     BUN 34 (*)     Creatinine 1.07      BUN/Creatinine Ratio 32 (*)     Calcium 9.5      Total Protein 8.2      Albumin 3.6      Globulin 4.6 (*)     A/G Ratio 0.8      Bilirubin, Total 0.8      Alk Phos 159 (*)     ALT 42      AST 21      eGFR 84     URINALYSIS, REFLEX TO URINE CULTURE - Abnormal    Color, UA Yellow      Clarity, UA Clear      pH, UA 5.5      Leukocytes, UA Negative      Nitrites, UA Negative      Protein, UA Negative      Glucose, UA >=1000 (*)     Ketones, UA Negative      Urobilinogen, UA 0.2      Bilirubin, UA Negative      Blood, UA Negative      Specific Gravity, UA 1.010     CBC WITH DIFFERENTIAL - Abnormal    WBC 9.05      RBC 5.60      Hemoglobin 16.3      Hematocrit 44.7      MCV 79.8 (*)     MCH 29.1      MCHC  36.5 (*)     RDW 12.7      Platelet Count 272      MPV 10.3      Neutrophils % 67.7 (*)     Lymphocytes % 20.3 (*)     Monocytes % 7.1 (*)     Eosinophils % 3.6      Basophils % 1.0      Immature Granulocytes % 0.3      nRBC, Auto 0.0      Neutrophils, Abs 6.12      Lymphocytes, Absolute 1.84      Monocytes, Absolute 0.64      Eosinophils, Absolute 0.33      Basophils, Absolute 0.09      Immature Granulocytes, Absolute 0.03      nRBC, Absolute 0.00      Diff Type Auto     POCT GLUCOSE MONITORING CONTINUOUS - Abnormal    POC Glucose 379 (*)    TROPONIN I - Normal    Troponin I High Sensitivity 14.9     DRUG SCREEN, URINE (BEAKER) - Normal    Barbiturates, Urine Negative      Benzodiazepine, Urine Negative      Opiates, Urine Negative      Phencyclidine, Urine Negative      Amphetamine, Urine Negative      Cannabinoid, Urine Negative      Cocaine, Urine Negative      Narrative:     The results of screening tests should be considered presumptive. Confirmatory testing is available upon request.    Cutoff Points:  PCP:         25ng/mL  AMPH:        500ng/mL  SAVANNAH:        200ng/mL  HENRIQUE:        200ng/mL  THC:         50ng/mL  CARMELINA:         300ng/mL  OPI:         2000ng/mL   CBC W/ AUTO DIFFERENTIAL    Narrative:     The following orders were created for panel order CBC auto differential.  Procedure                               Abnormality         Status                     ---------                               -----------         ------                     CBC with Differential[[id number]]       Abnormal            Final result                 Please view results for these tests on the individual orders.   POCT GLUCOSE MONITORING CONTINUOUS     EKG Readings: (Independently Interpreted)   Initial Reading: No STEMI. Previous EKG: Compared with most recent EKG Previous EKG Date: 04/13/2022. Rhythm: Sinus Tachycardia. Heart Rate: 102. Ectopy: No Ectopy. Conduction: Normal. Axis: Left Axis Deviation. Clinical Impression:  Sinus Tachycardia   SINUS TACHYCARDIA RATE  NO ACUTE ISCHEMIC CHANGES NO SIGNIFICANT CHANGE FROM PREVIOUS EKG       Imaging Results    None          Medications   insulin regular injection 5 Units 0.05 mL (has no administration in time range)     Medical Decision Making  Patient is a type 2 insulin-dependent diabetes mellitus patient history of noncompliance with a elevated blood sugar come rule out ketoacidosis, urinary tract infection, or electrolyte imbalance.    Amount and/or Complexity of Data Reviewed  Labs: ordered. Decision-making details documented in ED Course.  Discussion of management or test interpretation with external provider(s): Patient was blood sugar is 408, will give 5 units of IV insulin, advised patient to monitor his blood sugars for the next 4 hours.  He was follow up with the primary care provider, advised to get a hemoglobin A1c level, in May axis primary care provider to refer him to Endocrinology.  Also advised him to follow back up with the [facility]ologist at [facility], post hospitalization follow up.  Cardiac workup was negative.  See no indication for further workup or admission at this time.    Risk  OTC drugs.               ED Course as of 10/17/24 1218   Thu Oct 17, 2024   1118 POC Glucose(!): 379 [PW]   1135 Urinalysis, Reflex to Urine Culture(!) [PW]   1145 Drug Screen, Urine [PW]   1151 CBC auto differential(!) [PW]   1207 Comprehensive metabolic panel(!) [PW]   1208 Troponin I High Sensitivity: 14.9 [PW]   1217 Patient was demanded in his left before given insulin, states he had to leave. [PW]      ED Course User Index  [PW] Hank Rivera MD                           Clinical Impression:   Final diagnoses:  [I10] Hypertension  [E11.65] Type 2 diabetes mellitus with hyperglycemia, without long-term current use of insulin (Primary)        ED Disposition Condition    Discharge Stable          ED Prescriptions    None       Follow-up Information       Follow up With  Specialties Details Why Contact Info    [facility], Emergency Medicine In 1 day  [address] [id number] [phone]               Hank Rivera MD  10/17/24 1211       Hank Rivera MD  10/17/24 1217

## 2024-10-17 NOTE — ED NOTES
"Pt insisting that he needs to leave. Dr dillard notified that pt is requesting to leave and has not had the insulin that was ordered for him(awaiting pharmacy approval at that time). Dr dillard states "let him go". Pt iv dc and pt left hospital.  "

## 2024-10-21 LAB
OHS QRS DURATION: 88 MS
OHS QTC CALCULATION: 453 MS

## 2025-02-06 ENCOUNTER — TELEPHONE (OUTPATIENT)
Dept: DERMATOLOGY | Facility: CLINIC | Age: 55
End: 2025-02-06
Payer: MEDICARE

## 2025-04-01 DIAGNOSIS — L20.9 ATOPIC DERMATITIS: Primary | ICD-10-CM

## 2025-06-11 DIAGNOSIS — K59.00 CONSTIPATION: Primary | ICD-10-CM
